# Patient Record
Sex: FEMALE | Race: WHITE
[De-identification: names, ages, dates, MRNs, and addresses within clinical notes are randomized per-mention and may not be internally consistent; named-entity substitution may affect disease eponyms.]

---

## 2023-08-22 ENCOUNTER — HOSPITAL ENCOUNTER (OUTPATIENT)
Dept: HOSPITAL 47 - RADCTMAIN | Age: 38
Discharge: HOME | End: 2023-08-22
Attending: FAMILY MEDICINE
Payer: COMMERCIAL

## 2023-08-22 DIAGNOSIS — R91.8: Primary | ICD-10-CM

## 2023-08-22 PROCEDURE — 71250 CT THORAX DX C-: CPT

## 2023-08-22 NOTE — CT
EXAMINATION TYPE: CT chest wo con

CT DLP: 446.8 mGycm, Automated exposure control for dose reduction was used.

 

DATE OF EXAM: 8/22/2023 12:47 PM

 

COMPARISON: None

 

CLINICAL INDICATION:Female, 38 years old with history of R91.8 ABNORMAL FINDING LUNG FIELD; PHH, Abn 
CXR

 

TECHNIQUE: Multiple axial images were obtained through the chest without IV contrast. Lack of IV or o
ral contrast limits evaluation of solid and hollow organ viscera. . Coronal and sagittal reformats re
viewed.

 

FINDINGS: 

LUNGS/ PLEURA: No pleural effusion or pneumothorax. Patchy groundglass opacities within the left uppe
r lobe.  

AIRWAY: Patent and unremarkable..

HEART: Size within normal limits. No pericardial effusion.

MEDIASTINUM: No gross evidence of adenopathy.

VASCULATURE:  No aortic aneurysm. 

MUSCULOSKELETAL: No acute osseous abnormalities

SOFT TISSUES/LYMPH NODES: Unremarkable.

LOWER NECK: No significant findings.

UPPER ABDOMEN: No significant findings. 

 

IMPRESSION:

Left upper lobe patchy groundglass opacities suggestive of an atypical pneumonia.